# Patient Record
Sex: MALE | Race: WHITE | NOT HISPANIC OR LATINO
[De-identification: names, ages, dates, MRNs, and addresses within clinical notes are randomized per-mention and may not be internally consistent; named-entity substitution may affect disease eponyms.]

---

## 2021-08-16 ENCOUNTER — TRANSCRIPTION ENCOUNTER (OUTPATIENT)
Age: 21
End: 2021-08-16

## 2024-01-04 ENCOUNTER — APPOINTMENT (OUTPATIENT)
Dept: ORTHOPEDIC SURGERY | Facility: CLINIC | Age: 24
End: 2024-01-04
Payer: COMMERCIAL

## 2024-01-04 VITALS — BODY MASS INDEX: 20.53 KG/M2 | HEIGHT: 74 IN | WEIGHT: 160 LBS

## 2024-01-04 DIAGNOSIS — Z78.9 OTHER SPECIFIED HEALTH STATUS: ICD-10-CM

## 2024-01-04 DIAGNOSIS — S62.326A DISPLACED FRACTURE OF SHAFT OF FIFTH METACARPAL BONE, RIGHT HAND, INITIAL ENCOUNTER FOR CLOSED FRACTURE: ICD-10-CM

## 2024-01-04 PROBLEM — Z00.00 ENCOUNTER FOR PREVENTIVE HEALTH EXAMINATION: Status: ACTIVE | Noted: 2024-01-04

## 2024-01-04 PROCEDURE — 99203 OFFICE O/P NEW LOW 30 MIN: CPT | Mod: 25

## 2024-01-04 PROCEDURE — 73130 X-RAY EXAM OF HAND: CPT | Mod: RT

## 2024-01-04 PROCEDURE — 29125 APPL SHORT ARM SPLINT STATIC: CPT | Mod: RT

## 2024-01-04 NOTE — PROCEDURE
[FreeTextEntry3] : An ulnar gutter splint was applied in the functional position to the right hand.  Splint care was discussed with patient. The patient and family were advised to keep the splint clean and dry and not to put anything down the splint and to keep it elevated and to do ROM to finger/toes. Compartment syndrome was discussed. Patient was advised if they develop any numbness or tingling at any time or if the splint feels too tight or to lose, they must call the office immediately or go  to the ER. They were advised to watch for any issues with circulation.

## 2024-01-04 NOTE — ASSESSMENT
[FreeTextEntry1] : Right hand minimally displaced fifth metacarpal shaft fracture  Plan anti-inflammatories GI precautions ice 20 minutes on 20 minutes off and the patient was placed in ulnar gutter splint in the functional position.  Discussed with him about the options for surgery at this time.  I did advise him that is minimally displaced but without surgery he may always have a bump dorsally, he may lose the knuckle, he may have a prominence in the palm and for every 2 mm of shortening is a 7 degree extension loss.  I did advise him if he cannot except those things that he should have surgery but at this point he is declining any surgery.  I did advise him I do recommend wearing the splint remaining out of any sports and MMA and following up in 1 week with a splint check and maria l-ray.  I did advise him the risk of more displacement.  Will follow-up in 1 week.  All questions were answered and once again refuses any surgery at this time.  Patient was advised if they develop any severe pain, numbness or tingling or pain with range of motion to the fingers they must call or go to the emergency room immediately. All the signs and symptoms of compartment syndrome were explained.  The patient understands.  This medical record was created utilizing Dragon voice recognition software.  This software is not perfect and may occasionally create typographical errors which may be reflected in the above medical record.

## 2024-01-04 NOTE — IMAGING
[de-identified] : He is alert and oriented vital signs are stable.  Examination of The right wrist reveals no tenderness around the scaphoid volarly or dorsally or the scaphoid ligament or triangle fibrocartilage complex.  He does have swelling and ecchymosis overlying the dorsal aspect of his right hand overlying the fifth metacarpal region.  He has tenderness around the fifth metacarpal shaft region.  No tenderness around the metacarpal fifth distally.  His flexors and extensors were intact.  He was almost able to make a fist.  He does have no evidence for malrotation at this time while trying to make a fist.  He does have a slight extension lag at the fifth MCP joint.  There is no evidence of open wounds infection or compartment syndrome.Is no tenderness on the base of the fifth metacarpal.  No tenderness dorsally other than the fifth metacarpal shaft region.  Does have a slight bump in the region.No evidence for rotational malalignment.  There is no evidence of open wounds infection or compartment syndrome.  Has a normal neurologic exam normal vascular exam. [Right] : right hand [FreeTextEntry1] : X-rays the right hand including opposite oblique views reveal a right minimally displaced Fifth metacarpal shaft fracture.  There was some apex dorsal angulation.No dislocation.

## 2024-01-04 NOTE — HISTORY OF PRESENT ILLNESS
[Sports related] : sports related [7] : 7 [Dull/Aching] : dull/aching [Rest] : rest [de-identified] : Patient 23-year-old right-hand-dominant male injured his right hand on December patient's 23-year-old right-hand-dominant male injured his right hand on December 28, 2023.  Patient states that he is an  and he was training at that time and gym when he punched something and injured his right hand.  He says he went to [facility] on January 3, 2024 and was diagnosed with a fracture.  He now presents for hand consultation and a splint.  Denies any numbness or tingling but does complain of pain on the dorsal aspect of his right hand.  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 he went to [facility] yesterday and got an xray but they did not supply a disc. they put him in a splint and ace bandage.

## 2024-01-11 ENCOUNTER — APPOINTMENT (OUTPATIENT)
Dept: ORTHOPEDIC SURGERY | Facility: CLINIC | Age: 24
End: 2024-01-11
Payer: COMMERCIAL

## 2024-01-11 VITALS — WEIGHT: 160 LBS | HEIGHT: 74 IN | BODY MASS INDEX: 20.53 KG/M2

## 2024-01-11 PROCEDURE — 99213 OFFICE O/P EST LOW 20 MIN: CPT

## 2024-01-11 PROCEDURE — 73130 X-RAY EXAM OF HAND: CPT | Mod: RT

## 2024-01-11 NOTE — HISTORY OF PRESENT ILLNESS
[Sports related] : sports related [Dull/Aching] : dull/aching [Rest] : rest [de-identified] : Patient 23-year-old right-hand-dominant male injured his right hand on December patient's 23-year-old right-hand-dominant male injured his right hand on December 28, 2023.  Patient states that he is an  and he was training at that time and gym when he punched something and injured his right hand.  He says he went to [facility] on January 3, 2024 and was diagnosed with a fracture.  He now presents for hand consultation and a splint.  Denies any numbness or tingling but does complain of pain on the dorsal aspect of his right hand.    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 he went to [facility] yesterday and got an xray but they did not supply a disc. they put him in a splint and ace bandage.

## 2024-01-11 NOTE — IMAGING
[de-identified] : He is alert and oriented vital signs are stable.  Examination of the right wrist reveals no tenderness around the scaphoid volarly or dorsally or the scaphoid ligament or triangle fibrocartilage complex.  He does have decreased swelling and resolving ecchymosis overlying the dorsal aspect of his right hand overlying the fifth metacarpal region.  He has tenderness around the fifth metacarpal shaft region.  No tenderness around the metacarpal fifth distally.  His flexors and extensors were intact.  He was almost able to make a fist.  He does have no evidence for malrotation at this time while trying to make a fist.  He does have a slight extension lag at the fifth MCP joint.  There is no evidence of open wounds infection or compartment syndrome. There Is no tenderness on the base of the fifth metacarpal.  No tenderness dorsally other than the fifth metacarpal shaft region.  Does have a slight bump in the region. No evidence for rotational malalignment.  There is no evidence of open wounds infection or compartment syndrome.  Has a normal neurologic exam normal vascular exam. [Right] : right hand [FreeTextEntry1] : X-rays the right hand including opposite oblique views reveal a right minimally displaced Fifth metacarpal shaft fracture.  There was some apex dorsal angulation.No dislocation. No change in alignment on x-rays today.

## 2024-01-11 NOTE — ASSESSMENT
[FreeTextEntry1] : Right hand minimally displaced fifth metacarpal shaft fracture  Plan anti-inflammatories GI precautions ice 20 minutes on 20 minutes off and the patient  The patient is doing well at this time.  He can continue the ulnar gutter splint was put back on him in the functional position.  He will be seen back in 2 weeks. Discussed with him about the options for surgery at this time.  I did advise him that is minimally displaced but without surgery he may always have a bump dorsally, he may lose the knuckle, he may have a prominence in the palm and for every 2 mm of shortening is a 7 degree extension loss.  I did advise him if he cannot except those things that he should have surgery but at this point he is declining any surgery.  I did advise him I do recommend wearing the splint remaining out of any sports and MMA   He will follow-up in 2 weeks.  He was advised in 2 weeks to be placed in a wrist blood brace and start range of motion.  He Was advised remain out of sports and gym and not to use the hand to keep the splint on at all times.  All questions were answered.  Patient was advised if they develop any severe pain, numbness or tingling or pain with range of motion to the fingers they must call or go to the emergency room immediately. All the signs and symptoms of compartment syndrome were explained.  The patient understands.  This medical record was created utilizing Dragon voice recognition software.  This software is not perfect and may occasionally create typographical errors which may be reflected in the above medical record.

## 2024-01-25 ENCOUNTER — APPOINTMENT (OUTPATIENT)
Dept: ORTHOPEDIC SURGERY | Facility: CLINIC | Age: 24
End: 2024-01-25
Payer: COMMERCIAL

## 2024-01-25 VITALS — BODY MASS INDEX: 20.53 KG/M2 | WEIGHT: 160 LBS | HEIGHT: 74 IN

## 2024-01-25 PROCEDURE — 99213 OFFICE O/P EST LOW 20 MIN: CPT

## 2024-01-25 PROCEDURE — 73130 X-RAY EXAM OF HAND: CPT | Mod: RT

## 2024-02-10 NOTE — HISTORY OF PRESENT ILLNESS
[de-identified] : Patient 23-year-old right-hand-dominant male injured his right hand on December 28, 2023.  Patient states that he is an  and he was training at that time and gym when he punched something and injured his right hand.  He says he went to [facility] on January 3, 2024 and was diagnosed with a fracture.  He now presents for hand consultation and a splint.  Denies any numbness or tingling but does complain of pain on the dorsal aspect of his right hand.    1/25/24 pt here for fu of the right hand today states it is doing better. he is in an ulnar gutter splint.  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 he went to [facility] yesterday and got an xray but they did not supply a disc. they put him in a splint and ace bandage.

## 2024-02-10 NOTE — ASSESSMENT
[FreeTextEntry1] : Right hand minimally displaced fifth metacarpal shaft fracture early healing  Plan anti-inflammatories GI precautions ice 20 minutes on 20 minutes off and the patient  The patient is doing well at this time.  The ulnar gutter splint and placement of wrist support.  Will start range of motion and follow-up in 3 to 4 weeks to confirm full healing.  He was advised to remain out of MMA and sports as the fracture is not completely healed. I did advise him to do range of motion in the splint and come out of it with range of motion to the wrist and fingers.  He understands is not completely healed.  Patient and stands the risk of a permanent bump, Posterior range of motion especially extension and a prominence in the palm. Does understand that the fracture has not healed and he cannot hit anything and has been very careful.  Range of motion only in the wrist support brace. All questions were answered.  Patient was advised if they develop any severe pain, numbness or tingling or pain with range of motion to the fingers they must call or go to the emergency room immediately. All the signs and symptoms of compartment syndrome were explained.  The patient understands.  This medical record was created utilizing Dragon voice recognition software.  This software is not perfect and may occasionally create typographical errors which may be reflected in the above medical record.

## 2024-02-10 NOTE — IMAGING
[de-identified] : He is alert and oriented vital signs are stable.  Examination of the right wrist reveals no tenderness around the scaphoid volarly or dorsally or the scaphoid ligament or triangular fibrocartilage complex.   He does have decreased swelling overlying the dorsal aspect of his right hand overlying the fifth metacarpal region.  He has mild tenderness around the fifth metacarpal shaft region.  No tenderness around the metacarpal fifth distally.  His flexors and extensors were intact.  He was almost able to make a fist.  He does have no evidence for malrotation at this time while trying to make a fist.  He does have a slight extension lag at the fifth MCP joint.  There is no evidence of open wounds infection or compartment syndrome. There Is no tenderness on the base of the fifth metacarpal.  No tenderness dorsally other than the fifth metacarpal shaft region.  Does have a slight bump in the region. No evidence for rotational malalignment.  There is no evidence of open wounds infection or compartment syndrome.  Has a normal neurologic exam normal vascular exam. [FreeTextEntry1] : X-rays the right hand 3 views plus opposite oblique views reveal a right minimally displaced Fifth metacarpal shaft fracture.  There was some apex dorsal angulation.No dislocation. No change in alignment on x-rays.  today. There is there is early healing with callus on the radial side but no callus formation on the ulnar side of the fracture.

## 2024-02-28 ENCOUNTER — APPOINTMENT (OUTPATIENT)
Dept: ORTHOPEDIC SURGERY | Facility: CLINIC | Age: 24
End: 2024-02-28
Payer: COMMERCIAL

## 2024-02-28 PROCEDURE — 99213 OFFICE O/P EST LOW 20 MIN: CPT

## 2024-02-28 PROCEDURE — 73130 X-RAY EXAM OF HAND: CPT | Mod: RT

## 2024-02-28 NOTE — IMAGING
[Right] : right hand [de-identified] : He is alert and oriented vital signs are stable.  Examination of the right wrist reveals no tenderness around the scaphoid volarly or dorsally or the scaphoid ligament or triangular fibrocartilage complex.    He does have no swelling overlying the dorsal aspect of his right hand overlying the fifth metacarpal region.  He has no tenderness around the fifth metacarpal shaft region.  No tenderness around the metacarpal fifth distally.  His flexors and extensors were intact.  He is able to make a fist.  He does have no evidence for malrotation at this time while trying to make a fist.  He does have a slight extension lag at the fifth MCP joint.  There is no evidence of open wounds infection or compartment syndrome. There Is no tenderness on the base of the fifth metacarpal.  No tenderness dorsally other than the fifth metacarpal shaft region.  Does have a slight bump in the region. No evidence for rotational malalignment.  There is no evidence of open wounds infection or compartment syndrome.  Has a normal neurologic exam normal vascular exam. [FreeTextEntry1] : X-rays the right hand 3 views plus opposite oblique views reveal a right minimally displaced Fifth metacarpal shaft fracture.  There was some apex dorsal angulation. No dislocation. No change in alignment on x-rays.  There is abundant bridging callus on the radial side of the fracture but there is incomplete healing on the ulnar side of the fracture.  today. There is there is early healing with callus on the radial side but no callus formation on the ulnar side of the fracture.

## 2024-02-28 NOTE — HISTORY OF PRESENT ILLNESS
[Sports related] : sports related [1] : 2 [Dull/Aching] : dull/aching [Rest] : rest [de-identified] : Patient 23-year-old right-hand-dominant male injured his right hand on December 28, 2023.  Patient states that he is an  and he was training at that time and gym when he punched something and injured his right hand.  He says he went to [facility] on January 3, 2024 and was diagnosed with a fracture.  He now presents for hand consultation and a splint.  Denies any numbness or tingling but does complain of pain on the dorsal aspect of his right hand.    February 28, 2024.  Patient Destini for follow-up for his right hand.  He is not wearing his brace.  He has been doing MMA [employer] braces has not been hitting anything. 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 he went to [facility] yesterday and got an xray but they did not supply a disc. they put him in a splint and ace bandage.

## 2024-02-28 NOTE — ASSESSMENT
[FreeTextEntry1] : Right hand minimally displaced fifth metacarpal shaft fracture healing radial side with incomplete healing ulnar side  Plan anti-inflammatories GI precautions ice 20 minutes on 20 minutes off and the patient must be cooperative and wear the splint at all times.  I did advise he must wear the wrist support brace at all times for protection and to allow this to heal.  I did advise him range of motion of the brace only and he should not be taking the brace off.  I did advise him he does not cooperate this will go on to a nonunion and he will end up requiring surgery with screw fixation.  I did advise him he does mobilizes with the brace and only do range of motion to the fingers it will allow it to heal hemostat of the MMA and any other activities that he is doing.  Once again, I did advise the patient this goes on not heels due to the fact he is been noncompliant with the recommended treatment he showed up in the office today not wearing his brace.  He also has been doing MMA.  He was advised again to remain out of MMA and sports as the fracture is not completely healed. I did advise him to do range of motion in the brace only   He understands is not completely healed and that if he does not comply he could go on to nonunion require surgery.  Patient understands the risk of a permanent bump, permanent loss range of motion especially extension and a prominence in the palm.   he does understand that the fracture is not healed and he cannot hit anything and has to be very careful.  Range of motion only in the wrist support brace. All questions were answered.  Patient was advised if they develop any severe pain, numbness or tingling or pain with range of motion to the fingers they must call or go to the emergency room immediately. All the signs and symptoms of compartment syndrome were explained.  The patient understands.  This medical record was created utilizing Dragon voice recognition software.  This software is not perfect and may occasionally create typographical errors which may be reflected in the above medical record.

## 2024-03-20 ENCOUNTER — APPOINTMENT (OUTPATIENT)
Dept: ORTHOPEDIC SURGERY | Facility: CLINIC | Age: 24
End: 2024-03-20
Payer: COMMERCIAL

## 2024-03-20 DIAGNOSIS — S62.326D DISPLACED FRACTURE OF SHAFT OF FIFTH METACARPAL BONE, RIGHT HAND, SUBSEQUENT ENCOUNTER FOR FRACTURE WITH ROUTINE HEALING: ICD-10-CM

## 2024-03-20 PROCEDURE — 73130 X-RAY EXAM OF HAND: CPT | Mod: RT

## 2024-03-20 PROCEDURE — 99213 OFFICE O/P EST LOW 20 MIN: CPT

## 2024-03-20 NOTE — ASSESSMENT
[FreeTextEntry1] : Right hand minimally displaced fifth metacarpal shaft fracture healing radial side with incomplete healing ulnar side  Plan anti-inflammatories GI precautions ice 20 minutes on 20 minutes off  The patient is been cooperative wearing the splint he must continue to wear.  I did advise him he can remove the splint a few times a day and do range of motion to the fingers.  Also advised him as to range of motion in the brace.  He did develop stiff fingers has not been doing moving them even though he was advised to on prior visit.  I did advise him [place] the fingers fully straight and make a fist and walk on them and do a home exercise program at least 4-5 times per day.  I did advise him he can remove it while typing at times and also to do the home exercise program but is not doing the home exercises he must wear the brace.  The fracture is near healed and we follow-up in 3 weeks.  Advised him that stiffness will improve if he does the range of motion daily.  He was demonstrated the office on flexion extension of the fingers to make a full fist and fully straighten them.  He could also use his other hand to help straighten the fingers.  All questions were answered he will be seen back in 3 weeks for maria l-ray  He was advised again to remain out of MMA and sports as the fracture is not completely healed. I  He understands is not completely healed and that if he does not comply he could go on to nonunion require surgery.  Patient understands the risk of a permanent bump, permanent loss range of motion especially extension and a prominence in the palm.   he does understand that the fracture is not healed and he cannot hit anything and has to be very careful.   All questions were answered.  Patient was advised if they develop any severe pain, numbness or tingling or pain with range of motion to the fingers they must call or go to the emergency room immediately. All the signs and symptoms of compartment syndrome were explained.  The patient understands.  This medical record was created utilizing Dragon voice recognition software.  This software is not perfect and may occasionally create typographical errors which may be reflected in the above medical record.

## 2024-03-20 NOTE — HISTORY OF PRESENT ILLNESS
[Sports related] : sports related [1] : 2 [Dull/Aching] : dull/aching [Rest] : rest [de-identified] : Patient 23-year-old right-hand-dominant male injured his right hand on December 28, 2023.  Patient states that he is an  and he was training at that time and gym when he punched something and injured his right hand.  He says he went to [facility] on January 3, 2024 and was diagnosed with a fracture.  He now presents for hand consultation and a splint.  Denies any numbness or tingling but does complain of pain on the dorsal aspect of his right hand.    February 28, 2024.  Patient presents for follow-up for his right hand.  He is not wearing his brace.  He has been doing MMA AGA[employer] braces has not been hitting anything.  March 20, 2024.  Patient presents for follow-up for his right hand.  He says he is been wearing the brace full-time other than showering.  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 he went to [facility] yesterday and got an xray but they did not supply a disc. they put him in a splint and ace bandage.

## 2024-03-20 NOTE — IMAGING
[Right] : right hand [de-identified] : He is alert and oriented vital signs are stable.  Examination of the right wrist reveals no tenderness around the scaphoid volarly or dorsally or the scapholunate ligament or triangular fibrocartilage complex.    He does have no swelling overlying the dorsal aspect of his right hand overlying the fifth metacarpal region.  He has no tenderness around the fifth metacarpal shaft region.  No tenderness around the metacarpal fifth distally.  His flexors and extensors were intact.  He does have no evidence for malrotation at this time while trying to make a fist.  He does have a slight extension lag at the fifth MCP joint.  There is no evidence of open wounds infection or compartment syndrome. There Is no tenderness on the base of the fifth metacarpal.  No tenderness dorsally other than the fifth metacarpal shaft region which is mild.  He does have a slight bump in the region. No evidence for rotational malalignment.  He does have stiff fingers at this time as he is not moving them in the brace.  He was advised to move them on prior visit but wear the brace at all times.  There is no evidence of open wounds infection or compartment syndrome.  Has a normal neurologic exam normal vascular exam. [FreeTextEntry1] : X-rays the right hand 3 views plus opposite oblique views reveal a right minimally displaced Fifth metacarpal shaft fracture.  There was some apex dorsal angulation. No dislocation. No change in alignment on x-rays.  There is abundant bridging callus on the radial side of the fx and more healing on the ulnar side but not complete healing.  There has been improvement in the healing on the ulnar side from prior x-rays

## 2024-04-10 ENCOUNTER — APPOINTMENT (OUTPATIENT)
Dept: ORTHOPEDIC SURGERY | Facility: CLINIC | Age: 24
End: 2024-04-10
Payer: COMMERCIAL

## 2024-04-10 PROCEDURE — 73130 X-RAY EXAM OF HAND: CPT | Mod: RT

## 2024-04-10 PROCEDURE — 99213 OFFICE O/P EST LOW 20 MIN: CPT

## 2024-04-10 NOTE — IMAGING
[Right] : right hand [de-identified] : He is alert and oriented vital signs are stable.  Examination of the right wrist reveals no tenderness around the scaphoid volarly or dorsally or the scapholunate ligament or triangular fibrocartilage complex.    He does have no swelling overlying the dorsal aspect of his right hand overlying the fifth metacarpal region.  He has no tenderness around the fifth metacarpal shaft region.  No tenderness around the metacarpal fifth distally.  His flexors and extensors were intact.  He is able to make a fist.  He does have no evidence for malrotation at this time while trying to make a fist.  He does have a slight extension lag at the fifth MCP joint.  There is no evidence of open wounds infection or compartment syndrome. There Is no tenderness on the base of the fifth metacarpal.  No tenderness dorsally other than the fifth metacarpal shaft region.  Does have a slight bump in the region. No evidence for rotational malalignment. He is unable to fully extend his ring and small fingers as there is some stiffness.  There is no evidence of open wounds infection or compartment syndrome.  Has a normal neurologic exam normal vascular exam. [FreeTextEntry1] : X-rays the right hand 3 views plus opposite oblique views reveal a right minimally displaced Fifth metacarpal shaft fracture.  There was some apex dorsal angulation. No dislocation. No change in alignment on x-rays.  The fracture is near healed at this time.  There is bridging callus radial and only which is increased from previously there is a slight fracture lucency on the ulnar side but is approximate 95% healed.

## 2024-04-10 NOTE — ASSESSMENT
[FreeTextEntry1] : Right hand minimally displaced fifth metacarpal shaft fracture near healed  Plan I did advise the patient this time that he can stop the wrist support brace for protection.  The fracture is about 95% healed and he is slight additional healing.  I did advise him that I do recommend therapy but he declined.  He is can work on range of motion to the fingers on flexion and extension as well as range of motion to the wrist.  He will also do gentle strengthening.  He will be seen back 1 more time in 3 to 4 weeks to confirm full healing.  He will remain out of sports and gym and MMA until that time.  All questions were answered he is agreeable with the plan.  He was advised again to remain out of MMA and sports as the fracture is not completely healed. I did advise him to do range of motion in the brace only   He understands is not completely healed and that if he does not comply he could go on to nonunion require surgery.  Patient understands the risk of a permanent bump, permanent loss range of motion especially extension and a prominence in the palm.   he does understand that the fracture is not healed and he cannot hit anything and has to be very careful.  Range of motion only in the wrist support brace. All questions were answered.  Patient was advised if they develop any severe pain, numbness or tingling or pain with range of motion to the fingers they must call or go to the emergency room immediately. All the signs and symptoms of compartment syndrome were explained.  The patient understands.  This medical record was created utilizing Dragon voice recognition software.  This software is not perfect and may occasionally create typographical errors which may be reflected in the above medical record.
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
## 2024-05-01 ENCOUNTER — APPOINTMENT (OUTPATIENT)
Dept: ORTHOPEDIC SURGERY | Facility: CLINIC | Age: 24
End: 2024-05-01
Payer: COMMERCIAL

## 2024-05-01 DIAGNOSIS — S62.326G: ICD-10-CM

## 2024-05-01 PROCEDURE — 73130 X-RAY EXAM OF HAND: CPT | Mod: RT

## 2024-05-01 PROCEDURE — 99213 OFFICE O/P EST LOW 20 MIN: CPT

## 2024-05-01 NOTE — ASSESSMENT
[FreeTextEntry1] : Right hand minimally displaced fifth metacarpal shaft fracture healed  Plan the fracture is healed.  He does want to do some occupational therapy and he will be given a prescription.  He will follow-up as needed.  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  I did advise him this would give it time to heal even more and for him to strength in his hand.  All questions were answered he is agreeable with the plan.  He was advised again to remain out of MMA and sports as the fracture is not completely healed. I did advise him to do range of motion in the brace only   He understands is not completely healed and that if he does not comply he could go on to nonunion require surgery.  Patient understands the risk of a permanent bump, permanent loss range of motion especially extension and a prominence in the palm.   he does understand that the fracture is not healed and he cannot hit anything and has to be very careful.  Range of motion only in the wrist support brace. All questions were answered.  Patient was advised if they develop any severe pain, numbness or tingling or pain with range of motion to the fingers they must call or go to the emergency room immediately. All the signs and symptoms of compartment syndrome were explained.  The patient understands.  This medical record was created utilizing Dragon voice recognition software.  This software is not perfect and may occasionally create typographical errors which may be reflected in the above medical record.

## 2024-05-01 NOTE — IMAGING
[de-identified] : He is alert and oriented vital signs are stable.  Examination of the right wrist reveals no tenderness around the scaphoid volarly or dorsally or the scapholunate ligament or triangular fibrocartilage complex.    He does have no swelling overlying the dorsal aspect of his right hand overlying the fifth metacarpal region.  He has no tenderness around the fifth metacarpal shaft region.  No tenderness around the metacarpal fifth distally.  His flexors and extensors were intact.  He is able to make a fist.  He does have no evidence for malrotation at this time while trying to make a fist.  He does have a slight extension lag at the fifth MCP joint.  There is no evidence of open wounds infection or compartment syndrome. There Is no tenderness on the base of the fifth metacarpal.  No tenderness dorsally other than the fifth metacarpal shaft region.  Does have a slight bump in the region. No evidence for rotational malalignment. He has full range of motion of the fingers.  There is no evidence of open wounds infection or compartment syndrome.  Has a normal neurologic exam normal vascular exam. [Right] : right hand [FreeTextEntry1] : X-rays the right hand 3 views plus opposite oblique views reveal a right minimally displaced Fifth metacarpal shaft fracture.  There was some apex dorsal angulation. No dislocation. No change in alignment on x-rays.  The fracture is healed at this time.  There is bridging callus.
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
## 2024-09-11 ENCOUNTER — APPOINTMENT (OUTPATIENT)
Dept: ORTHOPEDIC SURGERY | Facility: CLINIC | Age: 24
End: 2024-09-11
Payer: COMMERCIAL

## 2024-09-11 DIAGNOSIS — S62.356A NONDISPLACED FRACTURE OF SHAFT OF FIFTH METACARPAL BONE, RIGHT HAND, INITIAL ENCOUNTER FOR CLOSED FRACTURE: ICD-10-CM

## 2024-09-11 PROCEDURE — 99213 OFFICE O/P EST LOW 20 MIN: CPT

## 2024-09-11 PROCEDURE — 73130 X-RAY EXAM OF HAND: CPT | Mod: RT

## 2024-09-11 NOTE — ASSESSMENT
[FreeTextEntry1] : Right hand nondisplaced fifth metacarpal shaft fracture (refracture)  Plan  I did advise the patient he has a refracture at this time although it is not as bad as the original fracture.  Is a nondisplaced fracture not going all the way across both cortices.  I did advise him refracture is can happen and at this point I do recommend he resume the wrist support brace which will protect him and no jujitsu and no punching people or objects.  I did advise him that there is always a risk for refracture anytime you injured right hand and I did advise him he does not go on to heal or too slow healing could end up requiring surgery.  At this point to try conservative treatment will remain at jujitsu and no punching and be seen back in 4 weeks.  All questions were answered he is agreeable with the plan.  He was advised again to remain out of MMA and sports   Patient understands the risk of a permanent bump, permanent loss range of motion especially extension and a prominence in the palm.   Range of motion only in the wrist support brace. All questions were answered.  Patient was advised if they develop any severe pain, numbness or tingling or pain with range of motion to the fingers they must call or go to the emergency room immediately. All the signs and symptoms of compartment syndrome were explained.  The patient understands.  This medical record was created utilizing Dragon voice recognition software.  This software is not perfect and may occasionally create typographical errors which may be reflected in the above medical record.

## 2024-09-11 NOTE — IMAGING
[Right] : right hand [de-identified] : He is alert and oriented vital signs are stable.  Examination of the right wrist reveals no tenderness around the scaphoid volarly or dorsally or the scapholunate ligament or triangular fibrocartilage complex.    Examination of the right hand reveals swelling overlying the dorsal aspect of his right hand overlying the fifth metacarpal region.  He has tenderness around the fifth metacarpal shaft region.  No tenderness around the metacarpal fifth distally.  His flexors and extensors were intact.  He is able to make a fist.  He does have no evidence for malrotation at this time while trying to make a fist.  He does have a slight extension lag at the fifth MCP joint.  There is no evidence of open wounds infection or compartment syndrome. There Is no tenderness on the base of the fifth metacarpal.  No tenderness dorsally other than the fifth metacarpal shaft region.  Does have a slight bump in the region. No evidence for rotational malalignment. He has full range of motion of the fingers.  There is no evidence of open wounds infection or compartment syndrome.  Has a normal neurologic exam normal vascular exam. [FreeTextEntry1] : X-rays the right hand 3 views plus opposite oblique views reveal a right minimally displaced Fifth metacarpal shaft fracture.  There was some apex dorsal angulation. No dislocation. No change in alignment on x-rays.  The fracture is healed at this time.  There is bridging callus.
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
## 2024-10-09 ENCOUNTER — APPOINTMENT (OUTPATIENT)
Dept: ORTHOPEDIC SURGERY | Facility: CLINIC | Age: 24
End: 2024-10-09
Payer: COMMERCIAL

## 2024-10-09 DIAGNOSIS — S62.356D NONDISPLACED FRACTURE OF SHAFT OF FIFTH METACARPAL BONE, RIGHT HAND, SUBSEQUENT ENCOUNTER FOR FRACTURE WITH ROUTINE HEALING: ICD-10-CM

## 2024-10-09 PROCEDURE — 99213 OFFICE O/P EST LOW 20 MIN: CPT

## 2024-10-09 PROCEDURE — 73130 X-RAY EXAM OF HAND: CPT | Mod: RT

## 2024-10-14 ENCOUNTER — APPOINTMENT (OUTPATIENT)
Dept: ORTHOPEDIC SURGERY | Facility: CLINIC | Age: 24
End: 2024-10-14

## 2024-10-14 VITALS — WEIGHT: 160 LBS | BODY MASS INDEX: 20.53 KG/M2 | HEIGHT: 74 IN

## 2024-10-14 DIAGNOSIS — S62.326G: ICD-10-CM

## 2024-10-14 PROCEDURE — 99203 OFFICE O/P NEW LOW 30 MIN: CPT

## 2024-11-11 ENCOUNTER — APPOINTMENT (OUTPATIENT)
Dept: ORTHOPEDIC SURGERY | Facility: CLINIC | Age: 24
End: 2024-11-11
Payer: COMMERCIAL

## 2024-11-11 VITALS — BODY MASS INDEX: 20.53 KG/M2 | WEIGHT: 160 LBS | HEIGHT: 74 IN

## 2024-11-11 DIAGNOSIS — S62.326G: ICD-10-CM

## 2024-11-11 PROCEDURE — 99213 OFFICE O/P EST LOW 20 MIN: CPT

## 2024-11-11 PROCEDURE — 73140 X-RAY EXAM OF FINGER(S): CPT | Mod: RT

## 2024-12-12 ENCOUNTER — APPOINTMENT (OUTPATIENT)
Dept: ORTHOPEDIC SURGERY | Facility: CLINIC | Age: 24
End: 2024-12-12
Payer: COMMERCIAL

## 2024-12-12 VITALS — HEIGHT: 74 IN | BODY MASS INDEX: 20.53 KG/M2 | WEIGHT: 160 LBS

## 2024-12-12 DIAGNOSIS — S62.326G: ICD-10-CM

## 2024-12-12 DIAGNOSIS — Z78.9 OTHER SPECIFIED HEALTH STATUS: ICD-10-CM

## 2024-12-12 PROCEDURE — 99212 OFFICE O/P EST SF 10 MIN: CPT

## 2024-12-12 PROCEDURE — 73130 X-RAY EXAM OF HAND: CPT | Mod: RT

## 2025-01-09 ENCOUNTER — APPOINTMENT (OUTPATIENT)
Dept: ORTHOPEDIC SURGERY | Facility: CLINIC | Age: 25
End: 2025-01-09
Payer: COMMERCIAL

## 2025-01-09 DIAGNOSIS — G56.22 LESION OF ULNAR NERVE, LEFT UPPER LIMB: ICD-10-CM

## 2025-01-09 DIAGNOSIS — S62.326G: ICD-10-CM

## 2025-01-09 PROCEDURE — 73130 X-RAY EXAM OF HAND: CPT | Mod: RT

## 2025-01-09 PROCEDURE — 99212 OFFICE O/P EST SF 10 MIN: CPT

## 2025-02-17 ENCOUNTER — APPOINTMENT (OUTPATIENT)
Dept: ORTHOPEDIC SURGERY | Facility: CLINIC | Age: 25
End: 2025-02-17
Payer: COMMERCIAL

## 2025-02-17 VITALS — WEIGHT: 160 LBS | BODY MASS INDEX: 20.53 KG/M2 | HEIGHT: 74 IN

## 2025-02-17 DIAGNOSIS — S62.326G: ICD-10-CM

## 2025-02-17 PROCEDURE — 99212 OFFICE O/P EST SF 10 MIN: CPT
